# Patient Record
Sex: MALE | Race: BLACK OR AFRICAN AMERICAN | NOT HISPANIC OR LATINO | Employment: FULL TIME | URBAN - NONMETROPOLITAN AREA
[De-identification: names, ages, dates, MRNs, and addresses within clinical notes are randomized per-mention and may not be internally consistent; named-entity substitution may affect disease eponyms.]

---

## 2021-02-25 ENCOUNTER — VIRTUAL VISIT (OUTPATIENT)
Dept: FAMILY MEDICINE | Facility: OTHER | Age: 26
End: 2021-02-25

## 2021-02-25 VITALS — BODY MASS INDEX: 26.66 KG/M2 | HEIGHT: 69 IN | WEIGHT: 180 LBS

## 2021-02-25 DIAGNOSIS — R05.9 COUGH: Primary | ICD-10-CM

## 2021-02-25 PROCEDURE — 99202 OFFICE O/P NEW SF 15 MIN: CPT | Mod: 95 | Performed by: PHYSICIAN ASSISTANT

## 2021-02-25 RX ORDER — PROMETHAZINE HYDROCHLORIDE AND CODEINE PHOSPHATE 6.25; 1 MG/5ML; MG/5ML
5 SYRUP ORAL 4 TIMES DAILY PRN
Qty: 240 ML | Refills: 1 | Status: SHIPPED | OUTPATIENT
Start: 2021-02-25

## 2021-02-25 ASSESSMENT — MIFFLIN-ST. JEOR: SCORE: 1796.85

## 2021-02-25 ASSESSMENT — PAIN SCALES - GENERAL: PAINLEVEL: NO PAIN (0)

## 2021-02-25 NOTE — PROGRESS NOTES
Emil is a 25 year old who is being evaluated via a billable telephone visit.      What phone number would you like to be contacted at? [phone]  How would you like to obtain your AVS? Mail a copy    Subjective  patient has had a cough and runny nose for 2 1/2 weeks. He is headed back to [place] now.   Emil is a 25 year old who presents for the following health issues     HPI     Ongoing cough for the last 2 weeks.  Patient states he had a prescription of Phenergan and states this worked well for his cough.  PDMP query shows he had adequate supply on 2/11/2021 in [facility] for take 6-day supply.  Today, patient reports ongoing symptoms.  He denies any fever, chills, night sweats, shortness of breath, localized chest pain other than substernally, dizziness, vertigo, or coughing up blood.  He has noted some dark green mucus.  He reports coughing once every couple hours and at night which disrupts his sleep.  He states he is used Tylenol Mucinex, and Robitussin without adequate relief.    Review of Systems  Constitutional, HEENT, cardiovascular, pulmonary, gi and gu systems are negative, except as otherwise noted.      Objective    Vitals - Patient Reported  Pain Score: No Pain (0)    Vitals:  No vitals were obtained today due to virtual visit.    Physical Exam   healthy, alert and no distress  PSYCH: Alert and oriented times 3; coherent speech, normal   rate and volume, able to articulate logical thoughts, able   to abstract reason, no tangential thoughts, no hallucinations   or delusions  His affect is normal  RESP: No cough, no audible wheezing, able to talk in full sentences  Remainder of exam unable to be completed due to telephone visits    ASSESMENT/PLAN  1. Cough        PDMP query completed electronically and appropriate.    I warned patient that he may be suffering from pneumonia and highly recommended A chest x-ray.  Patient states he does not feel like he needs an x-ray and that the   worked in the past and he feels it will work this time again.    Patient states he is unable to come to the clinic until next Wednesday.      At the behest of the patient who seemed unwilling to come into clinic sooner and after shared decision making I reluctantly refilled his Phenergan.  I offered patient alternatives such as benzonatate however he declined.    I again warned patient that I fear that he may have a pneumonia and this may make his symptoms worse.  I then stated that he he will need to make a in person office visit for any future refills.    Phone call duration: 21 minutes     MELIZA Gray  February 25, 2021  1:32 PM   [facility]

## 2023-04-17 ENCOUNTER — HOSPITAL ENCOUNTER (EMERGENCY)
Facility: HOSPITAL | Age: 28
Discharge: HOME | End: 2023-04-17
Attending: EMERGENCY MEDICINE | Admitting: EMERGENCY MEDICINE

## 2023-04-17 VITALS
HEART RATE: 75 BPM | TEMPERATURE: 97.9 F | OXYGEN SATURATION: 93 % | WEIGHT: 230 LBS | HEIGHT: 68 IN | RESPIRATION RATE: 22 BRPM | BODY MASS INDEX: 34.86 KG/M2 | DIASTOLIC BLOOD PRESSURE: 63 MMHG | SYSTOLIC BLOOD PRESSURE: 141 MMHG

## 2023-04-17 DIAGNOSIS — J45.901 EXACERBATION OF ASTHMA, UNSPECIFIED ASTHMA SEVERITY, UNSPECIFIED WHETHER PERSISTENT: ICD-10-CM

## 2023-04-17 DIAGNOSIS — R03.0 ELEVATED BLOOD PRESSURE READING WITHOUT DIAGNOSIS OF HYPERTENSION: ICD-10-CM

## 2023-04-17 PROBLEM — J45.909 ASTHMA: Status: ACTIVE | Noted: 2023-04-17

## 2023-04-17 PROCEDURE — 94640 AIRWAY INHALATION TREATMENT: CPT

## 2023-04-17 PROCEDURE — 99283 EMERGENCY DEPT VISIT LOW MDM: CPT | Mod: 25

## 2023-04-17 PROCEDURE — 250N000009 HC RX 250: Performed by: EMERGENCY MEDICINE

## 2023-04-17 PROCEDURE — 250N000012 HC RX MED GY IP 250 OP 636 PS 637: Performed by: EMERGENCY MEDICINE

## 2023-04-17 PROCEDURE — 99284 EMERGENCY DEPT VISIT MOD MDM: CPT | Mod: 25

## 2023-04-17 RX ORDER — ALBUTEROL SULFATE 0.83 MG/ML
2.5 SOLUTION RESPIRATORY (INHALATION) EVERY 4 HOURS PRN
Qty: 90 ML | Refills: 0 | Status: SHIPPED | OUTPATIENT
Start: 2023-04-17 | End: 2023-05-17

## 2023-04-17 RX ORDER — PREDNISONE 20 MG/1
60 TABLET ORAL ONCE
Status: COMPLETED | OUTPATIENT
Start: 2023-04-17 | End: 2023-04-17

## 2023-04-17 RX ORDER — PREDNISONE 20 MG/1
TABLET ORAL
Qty: 10 TABLET | Refills: 0 | Status: SHIPPED | OUTPATIENT
Start: 2023-04-17

## 2023-04-17 RX ORDER — ALBUTEROL SULFATE 0.83 MG/ML
5 SOLUTION RESPIRATORY (INHALATION) ONCE
Status: COMPLETED | OUTPATIENT
Start: 2023-04-17 | End: 2023-04-17

## 2023-04-17 RX ORDER — ALBUTEROL SULFATE 90 UG/1
2 AEROSOL, METERED RESPIRATORY (INHALATION) EVERY 6 HOURS PRN
Qty: 18 G | Refills: 0 | Status: SHIPPED | OUTPATIENT
Start: 2023-04-17

## 2023-04-17 RX ADMIN — PREDNISONE 60 MG: 20 TABLET ORAL at 00:49

## 2023-04-17 RX ADMIN — ALBUTEROL SULFATE 5 MG: 2.5 SOLUTION RESPIRATORY (INHALATION) at 00:50

## 2023-04-17 ASSESSMENT — ACTIVITIES OF DAILY LIVING (ADL): ADLS_ACUITY_SCORE: 35

## 2023-04-17 ASSESSMENT — ENCOUNTER SYMPTOMS
FEVER: 0
RHINORRHEA: 0
COUGH: 1
CHILLS: 0
SHORTNESS OF BREATH: 1
DIAPHORESIS: 0
CHEST TIGHTNESS: 1

## 2023-04-17 NOTE — ED TRIAGE NOTES
Pt presents from home for evaluation of SOB that started this afternoon. Pt has a hx of Asthma and tried using his nebulizer without much relief. He is not sure where his inhaler is due to recently moving. Reports a slight cough and chest tightness. States it feels similar to previous asthma exacerbations. Last nebulizer around 9pm.      Triage Assessment     Row Name 04/17/23 0014       Triage Assessment (Adult)    Airway WDL WDL       Respiratory WDL    Respiratory WDL X;rhythm/pattern    Rhythm/Pattern, Respiratory shortness of breath       Skin Circulation/Temperature WDL    Skin Circulation/Temperature WDL WDL       Cardiac WDL    Cardiac WDL WDL       Peripheral/Neurovascular WDL    Peripheral Neurovascular WDL WDL       Cognitive/Neuro/Behavioral WDL    Cognitive/Neuro/Behavioral WDL WDL

## 2023-04-17 NOTE — ED PROVIDER NOTES
EMERGENCY DEPARTMENT ENCOUNTER      NAME: Emil Reddy  AGE: 27 year old male  YOB: 1995  MRN: [id number]  EVALUATION DATE & TIME: 4/17/2023 12:17 AM    PCP: No Ref-Primary, Physician    ED PROVIDER: Gal Ramirez M.D.      Chief Complaint   Patient presents with     Shortness of Breath         IMPRESSION  1. Exacerbation of asthma, unspecified asthma severity, unspecified whether persistent    2. Elevated blood pressure reading without diagnosis of hypertension        PLAN  - prednisone 40mg daily x5 days  - refill albuterol nebulizer & inhaler  - close PCP follow up  - discharge to home    ED COURSE & MEDICAL DECISION MAKING    ED Course as of 04/17/23 0051   Mon Apr 17, 2023   0037 27yoM with history of asthma presenting with 1 day of wheezing, dry cough, shortness of breath. No runny nose, fevers, sweats, chills. No chest pain, pleuritic or exertional symptoms. Feels like his asthma is worse with recent weather change; states this happens often for him. States he has been using his nebulizer at home; lost his inhaler in recent move. Has no other complaints at this time.    SBP 160s on presentation with otherwise normal vitals. Calm on exam with normal phonation, no stridor, normal work of breathing, mild diffuse expiratory wheezing to lungs, no peripheral edema or calf tenderness, benign abdomen, overall well-appearing in no distress.    Suspect mild asthma exacerbation; likely from  environmental cause. Doubt bacterial pneumonia, sepsis, viral syndrome, ACS, PE, acute aortic syndrome, HTN emergency. Patient comfortable not obtaining CXR, viral swab, blood testing here now which is reasonable to me. Given albuterol neb & prednisone here now; will continue at home. Refills for nebulizer & inhaler given along with prednisone burst. Patient able to tolerate PO and walk without difficulty. Patient comfortable with discharge at this time. Return precautions and need for PCP follow up discussed  and understood. No further questions at the time of discharge.       --------------------------------------------------------------------------------   --------------------------------------------------------------------------------     12:30 AM I met with the patient for the initial interview and physical examination. Discussed plan for treatment and workup in the ED.      This patient involved a high degree of complexity in medical decision making, as significant risks were present and assessed. Recent encounters & results in medical record reviewed by me.    All workup (i.e. any EKG/labs/imaging as per charting below) reviewed and independently interpreted by me. See respective sections for details.    Broad differential considered for this patient presenting, including but not limited to:  Asthma exacerbation, viral syndrome, bacterial pneumonia, sepsis, ACS, PE, acute aortic syndrome, HTN emergency, asymptomatic HTN, whitecoat syndrome, CHF    I wore the following PPE during this patient encounter:  N95 mask, face shield w/ eye protection, gloves      See additional MDM below if interested.      MEDICATIONS GIVEN IN THE EMERGENCY DEPARTMENT  Medications   albuterol (PROVENTIL) neb solution 5 mg (has no administration in time range)   predniSONE (DELTASONE) tablet 60 mg (60 mg Oral $Given 4/17/23 0562)       NEW PRESCRIPTIONS STARTED AT TODAY'S ER VISIT  Current Discharge Medication List      START taking these medications    Details   albuterol (PROAIR HFA/PROVENTIL HFA/VENTOLIN HFA) 108 (90 Base) MCG/ACT inhaler Inhale 2 puffs into the lungs every 6 hours as needed for shortness of breath, wheezing or cough  Qty: 18 g, Refills: 0    Comments: Pharmacy may dispense brand covered by insurance (Proair, or proventil or ventolin or generic albuterol inhaler). Dispense with spacer to optimize medication administration.      albuterol (PROVENTIL) (2.5 MG/3ML) 0.083% neb solution Take 1 vial (2.5 mg) by nebulization  every 4 hours as needed for shortness of breath  Qty: 90 mL, Refills: 0      predniSONE (DELTASONE) 20 MG tablet Take two tablets (= 40mg) each day for 5 (five) days  Qty: 10 tablet, Refills: 0         CONTINUE these medications which have NOT CHANGED    Details   promethazine-codeine (PHENERGAN WITH CODEINE) 6.25-10 MG/5ML syrup Take 5 mLs by mouth 4 times daily as needed for congestion  Qty: 240 mL, Refills: 1    Associated Diagnoses: Cough                 =================================================================      HPI  Patient information was obtained from: Patient    Use of : N/A        Emil STEVEN Reddy is a 27 year old male with a pertinent history of asthma who presents to this ED via walk-in for evaluation of shortness of breath.    Patient endorses shortness of breath that has been ongoing since this afternoon, as well as a cough and chest tightness. Patient states his symptoms feel similar to past asthma exacerbations, and he has used his nebulier without relief. Last nebulizer around 9 PM. He notes that he usually gets asthma exacerbations when the weather changes.    Patient denies chest pian, rhinorrhea, fever, chills, sweats, and all other complaints at this time.      REVIEW OF SYSTEMS   Review of Systems   Constitutional: Negative for chills, diaphoresis and fever.   HENT: Negative for rhinorrhea.    Respiratory: Positive for cough, chest tightness and shortness of breath.    Cardiovascular: Negative for chest pain.   All other systems reviewed and are negative.    All other systems reviewed and are negative except as noted above in HPI.        --------------- MEDICAL HISTORY ---------------  PAST MEDICAL HISTORY:  Reviewed independently by me.  - asthma    PAST SURGICAL HISTORY:  Reviewed independently by me.  History reviewed. No pertinent surgical history.    CURRENT MEDICATIONS:    Reviewed independently by me.    Current Facility-Administered Medications:      albuterol  "(PROVENTIL) neb solution 5 mg, 5 mg, Nebulization, Once, Gal Ramirez MD    Current Outpatient Medications:      albuterol (PROAIR HFA/PROVENTIL HFA/VENTOLIN HFA) 108 (90 Base) MCG/ACT inhaler, Inhale 2 puffs into the lungs every 6 hours as needed for shortness of breath, wheezing or cough, Disp: 18 g, Rfl: 0     albuterol (PROVENTIL) (2.5 MG/3ML) 0.083% neb solution, Take 1 vial (2.5 mg) by nebulization every 4 hours as needed for shortness of breath, Disp: 90 mL, Rfl: 0     predniSONE (DELTASONE) 20 MG tablet, Take two tablets (= 40mg) each day for 5 (five) days, Disp: 10 tablet, Rfl: 0     promethazine-codeine (PHENERGAN WITH CODEINE) 6.25-10 MG/5ML syrup, Take 5 mLs by mouth 4 times daily as needed for congestion, Disp: 240 mL, Rfl: 1    ALLERGIES:  Reviewed independently by me.  No Known Allergies    FAMILY HISTORY:  Reviewed independently by me.  History reviewed. No pertinent family history.    SOCIAL HISTORY:   Reviewed independently by me.  Social History     Socioeconomic History     Marital status: Single   Tobacco Use     Smoking status: Never     Smokeless tobacco: Never   Substance and Sexual Activity     Alcohol use: Yes     Comment: occasionally     Drug use: Never       --------------- PHYSICAL EXAM ---------------  Nursing notes and vitals independently reviewed by me.  VITALS:  Vitals:    04/17/23 0013 04/17/23 0045   BP: (!) 160/92 (!) 140/78   Pulse: 83 79   Resp: 22    Temp: 97.7  F (36.5  C)    TempSrc: Oral    SpO2: 98% 97%   Weight: 104.3 kg (230 lb)    Height: 1.727 m (5' 8\")        PHYSICAL EXAM:    General:  alert, interactive, no distress  Eyes:  conjunctivae clear, conjugate gaze  HENT:  atraumatic, nose with no rhinorrhea, oropharynx clear  Neck:  no meningismus  Cardiovascular:  HR 80s during exam, regular rhythm, no murmurs, brisk cap refill  Chest:  no chest wall tenderness  Pulmonary:  no stridor, normal phonation, mild diffuse expiratory wheezes, normal work of " breathing,  Abdomen:  soft, nondistended, nontender  :  no CVA tenderness  Back:  no midline spinal tenderness  Musculoskeletal:  no pretibial edema, no calf tenderness. Gross ROM intact to joints of extremities with no obvious deformities.  Skin:  warm, dry, no rash  Neuro:  awake, alert, answers questions appropriately, follows commands, moves all limbs  Psych:  calm, normal affect                --------------- ADDITIONAL MDM ---------------  History:  - Supplemental history from:       -- see above charting, if applicable: patient  - External Record(s) reviewed:       -- see above charting, if applicable    Workup:  - Chart documentation above includes differential considered and any EKGs or imaging independently interpreted by provider.  - In additional to work up documented, I considered the following work up:       -- see above charting, if applicable (blood, CXR, viral swab, EKG)    External Consultation:  - Discussion of management with another provider:       -- see above charting, if applicable    Complicating Factors:  - Care impacted by chronic illness:       -- see above MDM, past medical history, & problem list  - Care affected by social determinants of health:       -- see above social history       -- Access to Medical Care    Disposition Considerations:  - Discharge       -- I considered admission given that the patient came to the Emergency Department, but ultimately discharged the patient per decision making above       -- I recommended the patient continue their current prescription strength medication(s) as charted above in current medications list       -- I prescribed prescription strength medication(s) as charted above         I, Uche Aguiar, am serving as a scribe to document services personally performed by Dr. Gal Ramirez based on my observation and the provider's statements to me. I, Gal Ramirez MD attest that Uche Aguiar is acting in a scribe capacity, has observed my performance  of the services and has documented them in accordance with my direction.      Gal Ramirez MD  04/17/23  Emergency Medicine  [facility] EMERGENCY DEPARTMENT  [address] [place] [id number] [phone]  Dept: [phone]      Gal Ramirez MD  04/17/23 0053

## 2023-04-17 NOTE — DISCHARGE INSTRUCTIONS
Take the prednisone as prescribed to help reduce the inflammation in your lungs.    Use your inhaler & nebulizer as prescribed.    Follow up with your Primary Care provider in 2 days for a recheck.    Return to the Emergency Department for any difficulty breathing, persistent vomiting, or any other concerns.